# Patient Record
Sex: MALE | Race: WHITE | NOT HISPANIC OR LATINO | URBAN - METROPOLITAN AREA
[De-identification: names, ages, dates, MRNs, and addresses within clinical notes are randomized per-mention and may not be internally consistent; named-entity substitution may affect disease eponyms.]

---

## 2023-04-16 ENCOUNTER — INPATIENT HOSPITAL (OUTPATIENT)
Dept: URBAN - METROPOLITAN AREA HOSPITAL 95 | Facility: HOSPITAL | Age: 58
End: 2023-04-16

## 2023-04-16 DIAGNOSIS — K29.50 UNSPECIFIED CHRONIC GASTRITIS WITHOUT BLEEDING: ICD-10-CM

## 2023-04-16 DIAGNOSIS — I25.10 ATHEROSCLEROTIC HEART DISEASE OF NATIVE CORONARY ARTERY WITH: ICD-10-CM

## 2023-04-16 DIAGNOSIS — Z79.01 LONG TERM (CURRENT) USE OF ANTICOAGULANTS: ICD-10-CM

## 2023-04-16 DIAGNOSIS — K92.1 MELENA: ICD-10-CM

## 2023-04-16 DIAGNOSIS — B37.81 CANDIDAL ESOPHAGITIS: ICD-10-CM

## 2023-04-16 PROCEDURE — 99222 1ST HOSP IP/OBS MODERATE 55: CPT | Performed by: INTERNAL MEDICINE

## 2023-04-16 PROCEDURE — 43235 EGD DIAGNOSTIC BRUSH WASH: CPT | Performed by: INTERNAL MEDICINE

## 2023-04-17 ENCOUNTER — INPATIENT HOSPITAL (OUTPATIENT)
Dept: URBAN - METROPOLITAN AREA HOSPITAL 95 | Facility: HOSPITAL | Age: 58
End: 2023-04-17

## 2023-04-17 DIAGNOSIS — K62.1 RECTAL POLYP: ICD-10-CM

## 2023-04-17 DIAGNOSIS — K92.1 MELENA: ICD-10-CM

## 2023-04-17 DIAGNOSIS — K63.5 POLYP OF COLON: ICD-10-CM

## 2023-04-17 PROCEDURE — 45378 DIAGNOSTIC COLONOSCOPY: CPT | Performed by: INTERNAL MEDICINE

## 2023-05-01 ENCOUNTER — OFFICE (OUTPATIENT)
Dept: URBAN - METROPOLITAN AREA CLINIC 19 | Facility: CLINIC | Age: 58
End: 2023-05-01

## 2023-05-01 VITALS
HEIGHT: 67 IN | WEIGHT: 133 LBS | HEART RATE: 70 BPM | DIASTOLIC BLOOD PRESSURE: 87 MMHG | SYSTOLIC BLOOD PRESSURE: 125 MMHG | OXYGEN SATURATION: 100 %

## 2023-05-01 DIAGNOSIS — Z86.010 PERSONAL HISTORY OF COLONIC POLYPS: ICD-10-CM

## 2023-05-01 DIAGNOSIS — Z79.01 LONG TERM (CURRENT) USE OF ANTICOAGULANTS: ICD-10-CM

## 2023-05-01 DIAGNOSIS — B37.81 CANDIDAL ESOPHAGITIS: ICD-10-CM

## 2023-05-01 DIAGNOSIS — R19.4 CHANGE IN BOWEL HABIT: ICD-10-CM

## 2023-05-01 PROCEDURE — 99204 OFFICE O/P NEW MOD 45 MIN: CPT

## 2023-05-01 NOTE — SERVICEHPINOTES
57-year-old single white male here for follow-up after recent hospitalization for anemia and presumed GI bleed.  He was experiencing intermittent melena and progressive anemia and presented to the ED 4/16/23 where he was found to have hematocrit of 27.7%.  His iron also was at 10 and he received an IV iron infusion while hospitalized.  EGD 4/16/23 with colleague, Santosh Osei MD, found Candida esophagitis and gastritis.  Colonoscopy 4/17/23 with colleague, Hunter Nguyễn MD, found 2 polyps that were not removed.  He was on Effient at the time and it was not deemed safe to remove the polyps.  A colonoscopy in a year was recommended to re-evaluate and likely remove these polyps.  He suffered a myocardial infarction in March of this year and underwent stents 3/26/23.  He has been on Effient since then and is felt that his GI bleed was precipitated by his anticoagulant therapy.  He denied taking any NSAIDs or having any GI symptoms, other than melena.  Since discharge from the hospital, he denies reflux, heartburn, nausea, vomiting, dysphagia, abdominal pain or overt GI bleeding.  He is on oral iron therapy and does admit to some constipation and "black" stools, but not the same kind of stools that he had prior to his hospitalization.  He has never had a colonoscopy.  He is currently on Protonix 40 milligrams BID.  He is on his last day of Diflucan for the Candida esophagitis.  His father had colon polyps in his 60s.

## 2023-05-01 NOTE — SERVICENOTES
The patient's assessment was reviewed with Dr. Stein and a collaborative plan of care was established.

## 2024-05-24 ENCOUNTER — ON CAMPUS - OUTPATIENT (OUTPATIENT)
Dept: URBAN - METROPOLITAN AREA HOSPITAL 97 | Facility: HOSPITAL | Age: 59
End: 2024-05-24

## 2024-05-24 DIAGNOSIS — R19.5 OTHER FECAL ABNORMALITIES: ICD-10-CM

## 2024-05-24 DIAGNOSIS — D50.9 IRON DEFICIENCY ANEMIA, UNSPECIFIED: ICD-10-CM

## 2024-05-24 PROCEDURE — 99222 1ST HOSP IP/OBS MODERATE 55: CPT | Mod: SA
